# Patient Record
Sex: MALE | Race: WHITE | NOT HISPANIC OR LATINO | URBAN - METROPOLITAN AREA
[De-identification: names, ages, dates, MRNs, and addresses within clinical notes are randomized per-mention and may not be internally consistent; named-entity substitution may affect disease eponyms.]

---

## 2019-10-08 ENCOUNTER — OUTPATIENT (OUTPATIENT)
Dept: OUTPATIENT SERVICES | Facility: HOSPITAL | Age: 74
LOS: 1 days | End: 2019-10-08
Payer: MEDICARE

## 2019-10-08 VITALS
HEART RATE: 36 BPM | DIASTOLIC BLOOD PRESSURE: 77 MMHG | HEIGHT: 70 IN | WEIGHT: 210.98 LBS | RESPIRATION RATE: 18 BRPM | SYSTOLIC BLOOD PRESSURE: 148 MMHG | TEMPERATURE: 98 F | OXYGEN SATURATION: 100 %

## 2019-10-08 DIAGNOSIS — Z90.49 ACQUIRED ABSENCE OF OTHER SPECIFIED PARTS OF DIGESTIVE TRACT: Chronic | ICD-10-CM

## 2019-10-08 DIAGNOSIS — Z90.89 ACQUIRED ABSENCE OF OTHER ORGANS: Chronic | ICD-10-CM

## 2019-10-08 DIAGNOSIS — Z01.818 ENCOUNTER FOR OTHER PREPROCEDURAL EXAMINATION: ICD-10-CM

## 2019-10-08 DIAGNOSIS — Z98.890 OTHER SPECIFIED POSTPROCEDURAL STATES: Chronic | ICD-10-CM

## 2019-10-08 LAB
ALBUMIN SERPL ELPH-MCNC: 4.1 G/DL — SIGNIFICANT CHANGE UP (ref 3.3–5.2)
ALP SERPL-CCNC: 91 U/L — SIGNIFICANT CHANGE UP (ref 40–120)
ALT FLD-CCNC: 38 U/L — SIGNIFICANT CHANGE UP
ANION GAP SERPL CALC-SCNC: 12 MMOL/L — SIGNIFICANT CHANGE UP (ref 5–17)
APTT BLD: 37.6 SEC — HIGH (ref 27.5–36.3)
AST SERPL-CCNC: 32 U/L — SIGNIFICANT CHANGE UP
BILIRUB SERPL-MCNC: 0.5 MG/DL — SIGNIFICANT CHANGE UP (ref 0.4–2)
BUN SERPL-MCNC: 21 MG/DL — HIGH (ref 8–20)
CALCIUM SERPL-MCNC: 9.2 MG/DL — SIGNIFICANT CHANGE UP (ref 8.6–10.2)
CHLORIDE SERPL-SCNC: 105 MMOL/L — SIGNIFICANT CHANGE UP (ref 98–107)
CO2 SERPL-SCNC: 23 MMOL/L — SIGNIFICANT CHANGE UP (ref 22–29)
CREAT SERPL-MCNC: 1.01 MG/DL — SIGNIFICANT CHANGE UP (ref 0.5–1.3)
GLUCOSE SERPL-MCNC: 89 MG/DL — SIGNIFICANT CHANGE UP (ref 70–115)
HCT VFR BLD CALC: 43.1 % — SIGNIFICANT CHANGE UP (ref 39–50)
HGB BLD-MCNC: 13.5 G/DL — SIGNIFICANT CHANGE UP (ref 13–17)
INR BLD: 1.44 RATIO — HIGH (ref 0.88–1.16)
MCHC RBC-ENTMCNC: 31 PG — SIGNIFICANT CHANGE UP (ref 27–34)
MCHC RBC-ENTMCNC: 31.3 GM/DL — LOW (ref 32–36)
MCV RBC AUTO: 99.1 FL — SIGNIFICANT CHANGE UP (ref 80–100)
PLATELET # BLD AUTO: 180 K/UL — SIGNIFICANT CHANGE UP (ref 150–400)
POTASSIUM SERPL-MCNC: 4.7 MMOL/L — SIGNIFICANT CHANGE UP (ref 3.5–5.3)
POTASSIUM SERPL-SCNC: 4.7 MMOL/L — SIGNIFICANT CHANGE UP (ref 3.5–5.3)
PROT SERPL-MCNC: 7.4 G/DL — SIGNIFICANT CHANGE UP (ref 6.6–8.7)
PROTHROM AB SERPL-ACNC: 16.7 SEC — HIGH (ref 10–12.9)
RBC # BLD: 4.35 M/UL — SIGNIFICANT CHANGE UP (ref 4.2–5.8)
RBC # FLD: 13.4 % — SIGNIFICANT CHANGE UP (ref 10.3–14.5)
SODIUM SERPL-SCNC: 140 MMOL/L — SIGNIFICANT CHANGE UP (ref 135–145)
WBC # BLD: 7.32 K/UL — SIGNIFICANT CHANGE UP (ref 3.8–10.5)
WBC # FLD AUTO: 7.32 K/UL — SIGNIFICANT CHANGE UP (ref 3.8–10.5)

## 2019-10-08 PROCEDURE — 93010 ELECTROCARDIOGRAM REPORT: CPT

## 2019-10-08 PROCEDURE — 85027 COMPLETE CBC AUTOMATED: CPT

## 2019-10-08 PROCEDURE — G0463: CPT

## 2019-10-08 PROCEDURE — 80053 COMPREHEN METABOLIC PANEL: CPT

## 2019-10-08 PROCEDURE — 93005 ELECTROCARDIOGRAM TRACING: CPT

## 2019-10-08 PROCEDURE — 36415 COLL VENOUS BLD VENIPUNCTURE: CPT

## 2019-10-08 PROCEDURE — 85730 THROMBOPLASTIN TIME PARTIAL: CPT

## 2019-10-08 PROCEDURE — 85610 PROTHROMBIN TIME: CPT

## 2019-10-08 NOTE — ASU PATIENT PROFILE, ADULT - PMH
Gout    HLD (hyperlipidemia)    HTN (hypertension)    PAF (paroxysmal atrial fibrillation) Gout    HLD (hyperlipidemia)    HTN (hypertension)    PAF (paroxysmal atrial fibrillation)    Sleep apnea

## 2019-10-08 NOTE — H&P PST ADULT - ASSESSMENT
This is a 74 year old male with HTN HL recent new onset PAF that was found on routine examine  . Pt presented to cardiologist for evaluation. A nuclear stress was done that revleaed juanian that he had returned to Atrial fibrillation.   He was recommended to have further testing a was referred for Cardiac cath   He presents today for PST prior to [facility] on 10/15   PRE-PROCEDURE ASSESSMENT  [facility] due to new onset atrial fibrillation   -Patient seen and examined  -Labs reviewed  -Pre-procedure teaching completed with patient  -Questions answered  -Eliquis This is a 74 year old male with HTN HL recent new onset PAF that was found on routine examine  . Pt presented to cardiologist for evaluation. A nuclear stress was done that revroshand siobhan that he had returned to Atrial fibrillation.   He was recommended to have further testing a was referred for Cardiac cath   He presents today for PST prior to [facility] on 10/15   Currently normotensive with asymptomatic bradycardia   PRE-PROCEDURE ASSESSMENT  [facility] due to new onset atrial fibrillation   -Patient seen and examined  -Labs reviewed  -Pre-procedure teaching completed with patient  -Questions answered  - NPO  except for medications with a sip of water on day of procedure   -Eliquis last dose on 10/12   - No Eliquis 10/13 , 10/14, 10/15   Continue 25 mg metoprolol bid   Any change in condition seek medical attention   reviewed education at length with patient and wife at bedside

## 2019-10-08 NOTE — H&P PST ADULT - NSICDXPASTMEDICALHX_GEN_ALL_CORE_FT
PAST MEDICAL HISTORY:  Gout     HLD (hyperlipidemia)     HTN (hypertension)     PAF (paroxysmal atrial fibrillation)

## 2019-10-08 NOTE — H&P PST ADULT - HISTORY OF PRESENT ILLNESS
This is a 74 year old male with HTN HL recent new onset PAF that was found on routine examine  . Pt presented to cardiologist for evaluation. A nuclear stress was done that revleaed agian that he had returned to Atrial fibrillation.   He was recommended to have further testing a was referred for Cardiac cath   He presents today for PST prior to [facility] on 10/15   Mallampati   ASA   BRA   GFR  EKG 10/8/19   Last dose of Eliquis   NST 9/18/19:Stress response was HTN ST response was non-diagnostic , no chest pain , baseline sinus bradycardia . During exercise converted to Atrial fibrillation    Echo 9/16/19 : EF 65% LA mildly dilated /RA moderately enlarged RV mildly enlarged with normal systolic function. LV with with impaired relation  Valves: Normal Aortic keke  Moderate MR , Mild TR , This is a 74 year old male with HTN HL recent new onset PAF that was found on routine examine he was started on a Beta blocker and Eliquis  . Pt presented to cardiologist for evaluation. A nuclear stress was done that revealed again that he had returned to Atrial fibrillation. He was evaluated by Dr Watt on 10/7 and noted to be bradycardic and his beta blocker was decreased from 50 bid to 25 mg po Bid.   He was recommended to have further testing a was referred for Cardiac cath  Pt denies chest pain, SOB  dizziness , syncope, palpitations , leg swelling , black or blood in stool , fever chills night sweats ,   He presents today for PST prior to [facility] on 10/15   Mallampati 2  ASA 2  BRA   GFR  EKG 10/8/19 : SB rate 36  no acute ST or T wave changes no symptoms  Reviewed with Dr Watt   Last dose of Eliquis will be Saturday 10/12/19   NST 9/18/19:Stress response was HTN ST response was non-diagnostic , no chest pain , baseline sinus bradycardia . During exercise converted to Atrial fibrillation    Echo 9/16/19 : EF 65% LA mildly dilated /RA moderately enlarged RV mildly enlarged with normal systolic function. LV with with impaired relation  Valves: Normal Aortic keke  Moderate MR , Mild TR , This is a 74 year old male with HTN HL recent new onset PAF that was found on routine examine he was started on a Beta blocker and Eliquis  . Pt presented to cardiologist for evaluation. A nuclear stress was done that revealed again that he had returned to Atrial fibrillation. He was evaluated by Dr Watt on 10/7 and noted to be bradycardic and his beta blocker was decreased from 50 bid to 25 mg po Bid.   He was recommended to have further testing a was referred for Cardiac cath  Pt denies chest pain, SOB  dizziness , syncope, palpitations , leg swelling , black or blood in stool , fever chills night sweats ,   He presents today for PST prior to [facility] on 10/15   Mallampati 2  ASA 2  BRA 0.8%   GFR78   EKG 10/8/19 : SB rate 36  no acute ST or T wave changes no symptoms  Reviewed with Dr Watt 10/8/19   Last dose of Eliquis will be Saturday 10/12/19   NST 9/18/19:Stress response was HTN ST response was non-diagnostic , no chest pain , baseline sinus bradycardia . During exercise converted to Atrial fibrillation    Echo 9/16/19 : EF 65% LA mildly dilated /RA moderately enlarged RV mildly enlarged with normal systolic function. LV with with impaired relation  Valves: Normal Aortic keke  Moderate MR , Mild TR ,

## 2019-10-08 NOTE — ASU PATIENT PROFILE, ADULT - PSH
History of appendectomy H/O colonoscopy with polypectomy    History of appendectomy    History of tonsillectomy

## 2019-10-15 ENCOUNTER — TRANSCRIPTION ENCOUNTER (OUTPATIENT)
Age: 74
End: 2019-10-15

## 2019-10-15 ENCOUNTER — OUTPATIENT (OUTPATIENT)
Dept: OUTPATIENT SERVICES | Facility: HOSPITAL | Age: 74
LOS: 1 days | End: 2019-10-15
Payer: MEDICARE

## 2019-10-15 VITALS
HEART RATE: 77 BPM | RESPIRATION RATE: 16 BRPM | OXYGEN SATURATION: 97 % | TEMPERATURE: 98 F | SYSTOLIC BLOOD PRESSURE: 142 MMHG | DIASTOLIC BLOOD PRESSURE: 96 MMHG

## 2019-10-15 VITALS
HEART RATE: 75 BPM | DIASTOLIC BLOOD PRESSURE: 81 MMHG | SYSTOLIC BLOOD PRESSURE: 128 MMHG | OXYGEN SATURATION: 97 % | RESPIRATION RATE: 16 BRPM

## 2019-10-15 DIAGNOSIS — Z98.890 OTHER SPECIFIED POSTPROCEDURAL STATES: Chronic | ICD-10-CM

## 2019-10-15 DIAGNOSIS — R94.39 ABNORMAL RESULT OF OTHER CARDIOVASCULAR FUNCTION STUDY: ICD-10-CM

## 2019-10-15 DIAGNOSIS — Z90.89 ACQUIRED ABSENCE OF OTHER ORGANS: Chronic | ICD-10-CM

## 2019-10-15 DIAGNOSIS — Z90.49 ACQUIRED ABSENCE OF OTHER SPECIFIED PARTS OF DIGESTIVE TRACT: Chronic | ICD-10-CM

## 2019-10-15 PROCEDURE — C1887: CPT

## 2019-10-15 PROCEDURE — 93458 L HRT ARTERY/VENTRICLE ANGIO: CPT

## 2019-10-15 PROCEDURE — C1769: CPT

## 2019-10-15 PROCEDURE — C1894: CPT

## 2019-10-15 PROCEDURE — 99152 MOD SED SAME PHYS/QHP 5/>YRS: CPT

## 2019-10-15 PROCEDURE — C1760: CPT

## 2019-10-15 RX ORDER — LOSARTAN POTASSIUM 100 MG/1
1 TABLET, FILM COATED ORAL
Qty: 0 | Refills: 0 | DISCHARGE

## 2019-10-15 RX ORDER — SIMVASTATIN 20 MG/1
1 TABLET, FILM COATED ORAL
Qty: 0 | Refills: 0 | DISCHARGE

## 2019-10-15 RX ORDER — SODIUM CHLORIDE 9 MG/ML
1000 INJECTION INTRAMUSCULAR; INTRAVENOUS; SUBCUTANEOUS
Refills: 0 | Status: COMPLETED | OUTPATIENT
Start: 2019-10-15 | End: 2019-10-15

## 2019-10-15 RX ORDER — APIXABAN 2.5 MG/1
1 TABLET, FILM COATED ORAL
Qty: 0 | Refills: 0 | DISCHARGE

## 2019-10-15 RX ORDER — AMLODIPINE BESYLATE 2.5 MG/1
5 TABLET ORAL ONCE
Refills: 0 | Status: COMPLETED | OUTPATIENT
Start: 2019-10-15 | End: 2019-10-15

## 2019-10-15 RX ORDER — ASPIRIN/CALCIUM CARB/MAGNESIUM 324 MG
81 TABLET ORAL ONCE
Refills: 0 | Status: COMPLETED | OUTPATIENT
Start: 2019-10-15 | End: 2019-10-15

## 2019-10-15 RX ORDER — CETIRIZINE HYDROCHLORIDE 10 MG/1
1 TABLET ORAL
Qty: 0 | Refills: 0 | DISCHARGE

## 2019-10-15 RX ORDER — METOPROLOL TARTRATE 50 MG
0.5 TABLET ORAL
Qty: 0 | Refills: 0 | DISCHARGE

## 2019-10-15 RX ORDER — CHOLECALCIFEROL (VITAMIN D3) 125 MCG
1 CAPSULE ORAL
Qty: 0 | Refills: 0 | DISCHARGE

## 2019-10-15 RX ORDER — AMLODIPINE BESYLATE 2.5 MG/1
1 TABLET ORAL
Qty: 0 | Refills: 0 | DISCHARGE

## 2019-10-15 RX ORDER — ALLOPURINOL 300 MG
1 TABLET ORAL
Qty: 0 | Refills: 0 | DISCHARGE

## 2019-10-15 RX ADMIN — SODIUM CHLORIDE 30 MILLILITER(S): 9 INJECTION INTRAMUSCULAR; INTRAVENOUS; SUBCUTANEOUS at 18:53

## 2019-10-15 RX ADMIN — Medication 81 MILLIGRAM(S): at 13:08

## 2019-10-15 RX ADMIN — AMLODIPINE BESYLATE 5 MILLIGRAM(S): 2.5 TABLET ORAL at 18:14

## 2019-10-15 NOTE — DISCHARGE NOTE PROVIDER - NSDCCPCAREPLAN_GEN_ALL_CORE_FT
PRINCIPAL DISCHARGE DIAGNOSIS  Diagnosis: S/P cardiac catheterization  Assessment and Plan of Treatment: Groin precautions   Resrt Eliquis on 10/16 am

## 2019-10-15 NOTE — DISCHARGE NOTE PROVIDER - HOSPITAL COURSE
s/p LHC revealing non obstructive CAD        Patient feels well.  Denies chest pain, shortness of breath, dizziness or palpitations at this time        Right groin procedure site CDI.  no bleeding, no hematoma, site soft, non tender, positive pedal pulses bilaterally Hemostasis with Angio-Seal Device     T(C): 36.4 (10-15-19 @ 12:37), Max: 36.4 (10-15-19 @ 12:37)    HR: 72 (10-15-19 @ 16:25) (72 - 85)    BP: 136/84 (10-15-19 @ 16:25) (136/84 - 154/94)    RR: 16 (10-15-19 @ 16:25) (16 - 16)    SpO2: 98% (10-15-19 @ 16:25) (97% - 98%)            Cardiac Cath Lab - Adult (10.15.19 @ 15:18) >    EF 55 %.    VALVES: MITRAL VALVE: The mitral valve exhibited trivial regurgitation    (less than 1+).    CORONARY VESSELS: The coronary circulation is right dominant.    LM:   --  LM: Normal.    LAD:   --  Mid LAD: Angiography  minor luminal irregularities with noflow limiting lesions.    Distal LAD: There was a diffuse 20 % stenosis.    CX:   --  Circumflex: Normal.    RCA:   --  Proximal RCA: There was a tubular 25 % stenosis.                A/p s/p LHC  done via RFA tolerated well revealing non obstructive CAD        -continue tele till discharge         -vital signs, labs, diet and activity per post procedure orders    - ambulate ad tasha post bedrest    -encourage PO fluids     - Restart Eliquis 10/16 am     - Groin precautions     -plan of care discussed with patient, family and MD     -post procedure and d/c instructions reviewed    -follow up with MD in 1-2 weeks    -Discussed therapeutic lifestyle changes to reduce risk factors such as following a cardiac diet, weight loss, maintaining a healthy weight, exercise, smoking cessation, m

## 2019-10-15 NOTE — PROGRESS NOTE ADULT - SUBJECTIVE AND OBJECTIVE BOX
s/p LHC revealing non obstructive CAD    Patient feels well.  Denies chest pain, shortness of breath, dizziness or palpitations at this time    Right groin procedure site CDI.  no bleeding, no hematoma, site soft, non tender, positive pedal pulses bilaterally Hemostasis with Angio-Seal Device   T(C): 36.4 (10-15-19 @ 12:37), Max: 36.4 (10-15-19 @ 12:37)  HR: 72 (10-15-19 @ 16:25) (72 - 85)  BP: 136/84 (10-15-19 @ 16:25) (136/84 - 154/94)  RR: 16 (10-15-19 @ 16:25) (16 - 16)  SpO2: 98% (10-15-19 @ 16:25) (97% - 98%)      Cardiac Cath Lab - Adult (10.15.19 @ 15:18) >  EF estimatedwas 55 %.  VALVES: MITRAL VALVE: The mitral valve exhibited trivial regurgitation  (less than 1+).  CORONARY VESSELS: The coronary circulation is right dominant.  LM:   --  LM: Normal.  LAD:   --  Mid LAD: Angiography  minor luminal irregularities with noflow limiting lesions.  Distal LAD: There was a diffuse 20 % stenosis.  CX:   --  Circumflex: Normal.  RCA:   --  Proximal RCA: There was a tubular 25 % stenosis.        A/p s/p LHC  done via RFA tolerated well revealing non obstructive CAD    -continue tele till discharge     -vital signs, labs, diet and activity per post procedure orders  - ambulate ad tasha post bedrest  -encourage PO fluids   - Restart Eliquis 10/16 am   - Groin precautions   -plan of care discussed with patient, family and MD   -post procedure and d/c instructions reviewed  -follow up with MD in 1-2 weeks  -Discussed therapeutic lifestyle changes to reduce risk factors such as following a cardiac diet, weight loss, maintaining a healthy weight, exercise, smoking cessation, medication compliance, and regular follow-up  with MD to know your numbers (BP, cholesterol, weight, and glucose)

## 2019-10-15 NOTE — DISCHARGE NOTE PROVIDER - CARE PROVIDER_API CALL
Curt Watt)  Cardiovascular Disease; Interventional Cardiology  [address] [place] [address]  Phone: [phone]  Fax: [phone]  Follow Up Time:

## 2019-10-15 NOTE — DISCHARGE NOTE PROVIDER - NSDCADMDATE_GEN_ALL_CORE_FT
15-Oct-2019 10:54 [No Acute Distress] : no acute distress [Well Nourished] : well nourished [Well Developed] : well developed [Well-Appearing] : well-appearing [Normal Sclera/Conjunctiva] : normal sclera/conjunctiva [PERRL] : pupils equal round and reactive to light [EOMI] : extraocular movements intact [Normal Outer Ear/Nose] : the outer ears and nose were normal in appearance [Normal Oropharynx] : the oropharynx was normal [No JVD] : no jugular venous distention [Supple] : supple [No Lymphadenopathy] : no lymphadenopathy [Thyroid Normal, No Nodules] : the thyroid was normal and there were no nodules present [No Respiratory Distress] : no respiratory distress  [Clear to Auscultation] : lungs were clear to auscultation bilaterally [No Accessory Muscle Use] : no accessory muscle use [Normal Rate] : normal rate  [Regular Rhythm] : with a regular rhythm [Normal S1, S2] : normal S1 and S2 [No Murmur] : no murmur heard [No Carotid Bruits] : no carotid bruits [No Abdominal Bruit] : a ~M bruit was not heard ~T in the abdomen [No Varicosities] : no varicosities [Pedal Pulses Present] : the pedal pulses are present [No Extremity Clubbing/Cyanosis] : no extremity clubbing/cyanosis [No Palpable Aorta] : no palpable aorta [Soft] : abdomen soft [Non Tender] : non-tender [No Masses] : no abdominal mass palpated [No HSM] : no HSM [Normal Bowel Sounds] : normal bowel sounds [Normal Supraclavicular Nodes] : no supraclavicular lymphadenopathy [Normal Posterior Cervical Nodes] : no posterior cervical lymphadenopathy [Normal Anterior Cervical Nodes] : no anterior cervical lymphadenopathy [No CVA Tenderness] : no CVA  tenderness [No Spinal Tenderness] : no spinal tenderness [No Joint Swelling] : no joint swelling [Grossly Normal Strength/Tone] : grossly normal strength/tone [No Rash] : no rash [Coordination Grossly Intact] : coordination grossly intact [No Focal Deficits] : no focal deficits [Speech Grossly Normal] : speech grossly normal [Memory Grossly Normal] : memory grossly normal [Normal Affect] : the affect was normal [Alert and Oriented x3] : oriented to person, place, and time [Normal Mood] : the mood was normal [Normal Insight/Judgement] : insight and judgment were intact [de-identified] : Obese [de-identified] : Supplemental O2 in place via nasal cannula [de-identified] : Patient on portable oxygen;  [de-identified] : Trace b/l LE pitting edema [de-identified] : Abdomen distended  [de-identified] : Generalized rash over left upper extremity. Right foot erythema between 4-5th toes. warmth.

## 2021-08-21 ENCOUNTER — EMERGENCY (EMERGENCY)
Facility: HOSPITAL | Age: 76
LOS: 1 days | Discharge: HOME | End: 2021-08-21
Attending: EMERGENCY MEDICINE | Admitting: EMERGENCY MEDICINE
Payer: MEDICARE

## 2021-08-21 VITALS
RESPIRATION RATE: 16 BRPM | HEART RATE: 88 BPM | OXYGEN SATURATION: 98 % | SYSTOLIC BLOOD PRESSURE: 125 MMHG | DIASTOLIC BLOOD PRESSURE: 88 MMHG | TEMPERATURE: 98 F

## 2021-08-21 VITALS
TEMPERATURE: 98 F | WEIGHT: 199.96 LBS | HEART RATE: 65 BPM | SYSTOLIC BLOOD PRESSURE: 152 MMHG | HEIGHT: 70 IN | DIASTOLIC BLOOD PRESSURE: 98 MMHG | OXYGEN SATURATION: 98 % | RESPIRATION RATE: 16 BRPM

## 2021-08-21 DIAGNOSIS — Z98.890 OTHER SPECIFIED POSTPROCEDURAL STATES: Chronic | ICD-10-CM

## 2021-08-21 DIAGNOSIS — Z90.89 ACQUIRED ABSENCE OF OTHER ORGANS: Chronic | ICD-10-CM

## 2021-08-21 DIAGNOSIS — Z90.49 ACQUIRED ABSENCE OF OTHER SPECIFIED PARTS OF DIGESTIVE TRACT: Chronic | ICD-10-CM

## 2021-08-21 PROCEDURE — 99285 EMERGENCY DEPT VISIT HI MDM: CPT | Mod: 57

## 2021-08-21 PROCEDURE — 90715 TDAP VACCINE 7 YRS/> IM: CPT

## 2021-08-21 PROCEDURE — 73140 X-RAY EXAM OF FINGER(S): CPT

## 2021-08-21 PROCEDURE — 72125 CT NECK SPINE W/O DYE: CPT

## 2021-08-21 PROCEDURE — 73140 X-RAY EXAM OF FINGER(S): CPT | Mod: 26,RT

## 2021-08-21 PROCEDURE — 70450 CT HEAD/BRAIN W/O DYE: CPT

## 2021-08-21 PROCEDURE — 26770 TREAT FINGER DISLOCATION: CPT | Mod: 54

## 2021-08-21 PROCEDURE — 99285 EMERGENCY DEPT VISIT HI MDM: CPT | Mod: 25

## 2021-08-21 PROCEDURE — 26770 TREAT FINGER DISLOCATION: CPT | Mod: F9

## 2021-08-21 PROCEDURE — 70450 CT HEAD/BRAIN W/O DYE: CPT | Mod: 26

## 2021-08-21 PROCEDURE — 72125 CT NECK SPINE W/O DYE: CPT | Mod: 26

## 2021-08-21 PROCEDURE — 90471 IMMUNIZATION ADMIN: CPT

## 2021-08-21 RX ORDER — TETANUS TOXOID, REDUCED DIPHTHERIA TOXOID AND ACELLULAR PERTUSSIS VACCINE, ADSORBED 5; 2.5; 8; 8; 2.5 [IU]/.5ML; [IU]/.5ML; UG/.5ML; UG/.5ML; UG/.5ML
0.5 SUSPENSION INTRAMUSCULAR ONCE
Refills: 0 | Status: COMPLETED | OUTPATIENT
Start: 2021-08-21 | End: 2021-08-21

## 2021-08-21 RX ORDER — TETANUS TOXOID, REDUCED DIPHTHERIA TOXOID AND ACELLULAR PERTUSSIS VACCINE, ADSORBED 5; 2.5; 8; 8; 2.5 [IU]/.5ML; [IU]/.5ML; UG/.5ML; UG/.5ML; UG/.5ML
0.5 SUSPENSION INTRAMUSCULAR ONCE
Refills: 0 | Status: DISCONTINUED | OUTPATIENT
Start: 2021-08-21 | End: 2021-08-21

## 2021-08-21 RX ADMIN — TETANUS TOXOID, REDUCED DIPHTHERIA TOXOID AND ACELLULAR PERTUSSIS VACCINE, ADSORBED 0.5 MILLILITER(S): 5; 2.5; 8; 8; 2.5 SUSPENSION INTRAMUSCULAR at 19:52

## 2021-08-21 NOTE — ED PROVIDER NOTE - PATIENT PORTAL LINK FT
You can access the FollowMyHealth Patient Portal offered by [facility] by registering at the following website: [email or web address]. By joining [facility]’s FollowMyHealth portal, you will also be able to view your health information using other applications (apps) compatible with our system.

## 2021-08-21 NOTE — ED PROVIDER NOTE - OBJECTIVE STATEMENT
Pt is a 77 yo male hx paf, on eloquis, pt s/p witnessed trip and fall  by wife and fell forward on wood floors hitting right hand and forehead, pt denies loc, n/v, neck pain, but co right 5th digit pain and deformity, no numbness or weakness, no cp, sob, back pain,  pt ambulating with no limp

## 2021-08-21 NOTE — ED ADULT NURSE NOTE - NSICDXPASTMEDICALHX_GEN_ALL_CORE_FT
PAST MEDICAL HISTORY:  Gout     HLD (hyperlipidemia)     HTN (hypertension)     PAF (paroxysmal atrial fibrillation)     Sleep apnea

## 2021-08-21 NOTE — ED PROVIDER NOTE - CARE PROVIDER_API CALL
Dilshad Briceno (MD)  Plastic Surgery  [address], [address]  [place] [address]  Phone: [phone]  Fax: [phone]  Follow Up Time: 4-6 Days

## 2021-08-21 NOTE — ED PROVIDER NOTE - MUSCULOSKELETAL, MLM
right 5th digit with swelling and dislocation at pip joint, no other bony ttp, from all other joints

## 2021-08-21 NOTE — ED ADULT NURSE NOTE - NSICDXPASTSURGICALHX_GEN_ALL_CORE_FT
PAST SURGICAL HISTORY:  H/O colonoscopy with polypectomy     History of appendectomy     History of tonsillectomy

## 2021-08-21 NOTE — ED PROVIDER NOTE - CONSTITUTIONAL, MLM
normal... elderly, Well appearing, awake, alert, oriented to person, place, time/situation and in no apparent distress.

## 2021-08-21 NOTE — ED PROVIDER NOTE - DOMESTIC TRAVEL HIGH RISK QUESTION
Physical Exam  Mallampati: II  TM Distance: >3 FB  Neck ROM: Full  Cardio Rhythm: Regular  Cardio Rate: Normal  Breath sounds clear to auscultation:  Yes  Patient Demonstrates:  Obese  dental exam normal      Anesthesia Plan  ASA Status: 3  Anesthesia Type: MAC  Induction: Intravenous  Maintenance: TIVA  Reviewed: Lab Results, EKG, Chest X-Rays, Consultations, Nursing Notes, Pre-Induction Reassessment, Patient Summary, Beta Blocker Status, DNR Status, NPO Status, Allergies, Problem List, Medications and Past Med History  The proposed anesthetic plan, including its risks and benefits, have been discussed with the Patient - along with the risks and benefits of alternatives.  Questions were encouraged and answered and the patient and/or representative agrees to proceed.  Informed Consent for Blood: Refused  Patient is: Yazidi      Anesthesia ROS/Med Hx    Overall Review:  Pts. EKG was reviewed and Pts.echo was reviewed     Cardiovascular Review:  Cardiovascular ROS notes: Recent CABG and AVR 8/2017    Pt. positive for CAD  Pt. positive for CABG/stent  Pt. positive for valvular problems/murmurs (s/p AVR 8/2017) - murmur type AS  Pt. positive for hypertension  Pt. positive for hyperlipidemia  Pt. positive for dysrhythmias - Chronic A-fib    End/Other Review:    Pt. positive for anemia (likely due to suspected GI bleed) - Acute and Chronic    
No

## 2021-08-21 NOTE — ED PROCEDURE NOTE - CPROC ED TIME OUT STATEMENT1
“Patient's name, , procedure and correct site were confirmed during the [facility] Timeout.”
Attending Attestation (For Attendings USE Only)...

## 2021-08-21 NOTE — ED PROVIDER NOTE - CLINICAL SUMMARY MEDICAL DECISION MAKING FREE TEXT BOX
pt sp fall on eloquis, check ct head and neck, finger dislocation, reduced, splint, d/c and fu hand.

## 2021-08-21 NOTE — ED ADULT NURSE NOTE - OBJECTIVE STATEMENT
Patient alert and oriented X 3. Complaining of right 5th finger deformity, swelling s/p trip and fall today. Denies chest pain, shortness of breath, nausea, vomiting, headache, dizziness and fever. Lungs clears bilaterally. Respirations even and not labored. Abdomen soft non tender. Patient alert and oriented X 3. Complaining of right 5th finger deformity, swelling s/p trip and fall today. Patient admits to hitting head. No LOC on Eliquis. + radial pulse bilaterally. Denies chest pain, shortness of breath, nausea, vomiting, headache, dizziness and fever. Lungs clears bilaterally. Respirations even and not labored. Abdomen soft non tender.

## 2021-08-21 NOTE — ED ADULT TRIAGE NOTE - CHIEF COMPLAINT QUOTE
pt tripped and fell at home today, hit head on door jamb - on eliquis  pt has bump to forehead and nose, c/o pain to left knee, also has dislocated right 5th finger

## 2021-08-21 NOTE — ED PROVIDER NOTE - NSFOLLOWUPINSTRUCTIONS_ED_ALL_ED_FT
return for lethagy, vomiting, confusion.  numbness or weakness,  keep splint on finger and keep clean and dry,  tylenol  for pain,   ice, elevate.  follow up with hand surgeon in 3-5 days.

## 2021-08-21 NOTE — ED PROVIDER NOTE - CARE PLAN
Principal Discharge DX:	Fall  Secondary Diagnosis:	Finger dislocation  Secondary Diagnosis:	Finger dislocation   1

## 2021-08-22 ENCOUNTER — TRANSCRIPTION ENCOUNTER (OUTPATIENT)
Age: 76
End: 2021-08-22

## 2021-08-22 PROBLEM — M10.9 GOUT, UNSPECIFIED: Chronic | Status: ACTIVE | Noted: 2019-10-08

## 2021-08-22 PROBLEM — G47.30 SLEEP APNEA, UNSPECIFIED: Chronic | Status: ACTIVE | Noted: 2019-10-08

## 2021-08-22 PROBLEM — E78.5 HYPERLIPIDEMIA, UNSPECIFIED: Chronic | Status: ACTIVE | Noted: 2019-10-08

## 2021-08-22 PROBLEM — I10 ESSENTIAL (PRIMARY) HYPERTENSION: Chronic | Status: ACTIVE | Noted: 2019-10-08

## 2021-08-22 PROBLEM — I48.0 PAROXYSMAL ATRIAL FIBRILLATION: Chronic | Status: ACTIVE | Noted: 2019-10-08

## 2021-08-23 NOTE — DISCHARGE NOTE PROVIDER - NSDCQMSTROKE_NEU_ALL_CORE
Patient scheduled for renal us  at [address] [place] [address],[address] on Aug 30 at 8 arrive at 7:45. Patient advised of instructions.   Order Lina Raphael to patient No

## 2022-02-02 NOTE — H&P PST ADULT - GENERAL
Hi doctor pt wife called to refill prescriptions because he is almost out of medicine. The pharmacy is [facility] [phone].   Thank you   opal negative

## 2022-05-10 NOTE — ED PROVIDER NOTE - CPE EDP EYES NORM
normal... Hydroquinone Counseling:  Patient advised that medication may result in skin irritation, lightening (hypopigmentation), dryness, and burning.  In the event of skin irritation, the patient was advised to reduce the amount of the drug applied or use it less frequently.  Rarely, spots that are treated with hydroquinone can become darker (pseudoochronosis).  Should this occur, patient instructed to stop medication and call the office. The patient verbalized understanding of the proper use and possible adverse effects of hydroquinone.  All of the patient's questions and concerns were addressed.

## 2022-12-06 NOTE — DISCHARGE NOTE PROVIDER - NSDCFUADDINST_GEN_ALL_CORE_FT
[facility] Follow Up Evaluation    Referred by: Mable Andre PA-C  Reason for referral: back pain    CC:   Chief Complaint   Patient presents with    Back Pain      Last 3 PDI Scores 8/4/2022 2/2/2021 4/16/2014   Pain Disability Index (PDI) 50 24 40       Interval HPI 12/6/22: Mr. Pike presents to the office for follow up.  Since I last saw him he is status post L4-5 LETITIA on 9/14/22 without significant relief of his pain.  Today he reports that he continues to have back pain that radiates down the right greater than left leg.  He reports he gets intermittent numbness down into the right leg as well.  His pain is constant but it is exacerbated with standing and walking.  [place] describes [place] can not tolerate an outing there and needs to sit and rest.  Denies any weakness.    Pain intervention history:   - s/p ganglion impar block on 2/11/21 and reports near 100% relief   - s/p ganglion impar block on 8/19/21 without significant relief.  - s/p L4-5 LETITIA on 9/14/22 without significant relief    HPI:   Zachariah Pike is a 75 y.o. male who complains of back pain    Onset: 45 years  Inciting Event: fell on tailbone while moving a refrigerator   Progression: since onset, pain is stable  Current Pain Score: 10/10  Timing: constant  Quality: sharp, electric  Radiation: no  Associated numbness or weakness: no numbness, no weakness   Exacerbated by: sitting, bending  Allievated by: nothing  Is Pain Level Acceptable?: No    Previous Therapies:  PT/OT:   HEP:   Interventions:   Surgery:  Medications:   - NSAIDS: mobic   - MSK Relaxants: flexeril  - TCAs:   - SNRIs:   - Topicals:   - Anticonvulsants: gabapentin  - Opioids:     History:    Current Outpatient Medications:     ACCU-CHEK PRASHANT PLUS TEST STRP Strp, INJECT 1 EACH INTO THE SKIN 2 (TWO) TIMES DAILY., Disp: 100 strip, Rfl: 2    ACCU-CHEK SOFTCLIX LANCETS MISC, Accu-Chek Softclix Lancets, Disp: , Rfl:      "amiodarone (PACERONE) 200 MG Tab, Take 200 mg by mouth once daily., Disp: , Rfl:     aspirin 325 MG tablet, Take 325 mg by mouth once daily., Disp: , Rfl:     clopidogreL (PLAVIX) 75 mg tablet, Take 1 tablet (75 mg total) by mouth once daily., Disp: 90 tablet, Rfl: 2    FLUoxetine 20 MG capsule, Take 1 capsule (20 mg total) by mouth once daily., Disp: 90 capsule, Rfl: 2    gabapentin (NEURONTIN) 600 MG tablet, TAKE 1 TABLET (600 MG TOTAL) BY MOUTH 2 (TWO) TIMES DAILY. TAKE 1 TABLETS NIGHTLY AS NEEDED, Disp: 180 tablet, Rfl: 2    insulin detemir U-100 (LEVEMIR FLEXTOUCH) 100 unit/mL (3 mL) SubQ InPn pen, Inject 15 Units into the skin 2 (two) times daily., Disp: 9 mL, Rfl: 6    loratadine (CLARITIN) 10 mg tablet, Take 1 tablet (10 mg total) by mouth once daily., Disp: , Rfl:     montelukast (SINGULAIR) 10 mg tablet, TAKE 1 TABLET BY MOUTH EVERY DAY IN THE EVENING, Disp: 90 tablet, Rfl: 1    pen needle, diabetic (BD ULTRA-FINE ARLEEN PEN NEEDLE) 32 gauge x 5/32" Ndle, 1 Units by Misc.(Non-Drug; Combo Route) route 2 (two) times a day., Disp: 100 each, Rfl: 3    sacubitriL-valsartan (ENTRESTO) 24-26 mg per tablet, Take 1 tablet by mouth 2 (two) times daily., Disp: 60 tablet, Rfl: 0    HYDROcodone-acetaminophen (NORCO) 5-325 mg per tablet, Take 1 tablet by mouth every 12 (twelve) hours as needed for Pain., Disp: 40 tablet, Rfl: 0    Past Medical History:   Diagnosis Date    Allergy     Aortic stenosis     Arthritis     Asthma     Attention deficit disorder of adult     CAD (coronary artery disease)     SEVERE:  angiogram 08/02/2017  Dr. Jean. results sent for scanning    CHF (congestive heart failure)     chronic systolic and diastolic    Chronic back pain     CKD (chronic kidney disease), stage III     COPD (chronic obstructive pulmonary disease)     Defibrillator discharge     Diabetes mellitus, type 2     Diverticulitis     HEARING LOSS     Heart murmur     History of colonoscopy 10/10/2014    Hyperlipidemia     " Hypertension     Otitis media     PVD (peripheral vascular disease)     Skin tear of forearm without complication, right, sequela 06/03/2018    Statin intolerance     Vertebral artery stenosis     90% stenosis.     Vertigo        Past Surgical History:   Procedure Laterality Date    ADENOIDECTOMY      BOWEL RESECTION  2004    CARDIAC DEFIBRILLATOR PLACEMENT      CATARACT EXTRACTION Bilateral 2005    [facility]    cataract surgery      CHEST SURGERY      chestwall rebuild (after accident)    CIRCUMCISION, PRIMARY      COLECTOMY      COLONOSCOPY      CORONARY ARTERY BYPASS GRAFT      CORONARY STENT PLACEMENT      EPIDURAL STEROID INJECTION INTO LUMBAR SPINE N/A 9/14/2022    Procedure: Injection-steroid-epidural-lumbar L4/5;  Surgeon: Joel Phlilips MD;  Location: [facility] OR;  Service: Pain Management;  Laterality: N/A;    extracorporeal shockwave lithotripsy      Fused Vertebrae      cervical fusion    INJECTION OF ANESTHETIC AGENT AROUND GANGLION IMPAR N/A 02/11/2021    Procedure: BLOCK, GANGLION IMPAR;  Surgeon: Joel Phillips MD;  Location: [facility] OR;  Service: Pain Management;  Laterality: N/A;    INJECTION OF ANESTHETIC AGENT AROUND GANGLION IMPAR N/A 08/19/2021    Procedure: BLOCK, GANGLION IMPAR;  Surgeon: Joel Phillips MD;  Location: [facility] OR;  Service: Pain Management;  Laterality: N/A;    PERIPHERAL ARTERIAL STENT GRAFT  11/2016    right leg     removal of colon polyp      SMALL INTESTINE SURGERY      diverticulosis    tonsillectomy      TRANSCATHETER AORTIC VALVE REPLACEMENT (TAVR)  01/17/2019    Procedure: REPLACEMENT, AORTIC VALVE, TRANSCATHETER (TAVR);  Surgeon: Abdelrahman Antony MD;  Location: [facility] CATH LAB;  Service: Cardiology;;    TRANSCATHETER AORTIC VALVE REPLACEMENT (TAVR) BY TRANSAPICAL APPROACH N/A 01/17/2019    Procedure: REPLACEMENT, AORTIC VALVE, TRANSCATHETER, TRANSAPICAL APPROACH;  Surgeon: Kareem Craig MD;  Location: [facility] OR [facility] FLR;  Service: Cardiovascular;  Laterality: N/A;     TRANSCATHETER AORTIC VALVE REPLACEMENT (TAVR) BY TRANSAPICAL APPROACH N/A 2019    Procedure: REPLACEMENT, AORTIC VALVE, TRANSCATHETER, TRANSAPICAL APPROACH;  Surgeon: Abdelrahman Antony MD;  Location: [facility] CATH LAB;  Service: Cardiology;  Laterality: N/A;  OR11 CASE, ERECTOR SPINAL (REGIONAL) BLOCK , ALONG WITH GENERAL ANESTHESIA    VASECTOMY      VASECTOMY REVERSAL         Family History   Problem Relation Age of Onset    Macular degeneration Father     Psoriasis Daughter     Glaucoma Neg Hx     Retinal detachment Neg Hx     Allergic rhinitis Neg Hx     Allergies Neg Hx     Angioedema Neg Hx     Asthma Neg Hx     Atopy Neg Hx     Eczema Neg Hx     Immunodeficiency Neg Hx     Rhinitis Neg Hx     Urticaria Neg Hx        Social History     Socioeconomic History    Marital status:    Tobacco Use    Smoking status: Former     Packs/day: 1.00     Years: 50.00     Pack years: 50.00     Types: Cigarettes, Vaping with nicotine     Quit date: 3/1/2022     Years since quittin.7    Smokeless tobacco: Never    Tobacco comments:     no longer vapes as of 8/10/21    Substance and Sexual Activity    Alcohol use: Not Currently     Comment: rarely    Drug use: No    Sexual activity: Yes     Partners: Female       Review of patient's allergies indicates:   Allergen Reactions    Clindamycin Other (See Comments)     Throat swelling , nausea, diarrhea    Penicillins Diarrhea    Oxycodone-acetaminophen Hives     Pt states he can take tylenol, hydrocodone with no problem    Statins-hmg-coa reductase inhibitors Swelling       Review of Systems:  General ROS: negative for - fever  Psychological ROS: negative for - hostility  Hematological and Lymphatic ROS: negative for - bleeding problems  Endocrine ROS: negative for - unexpected weight changes  Respiratory ROS: no cough, shortness of breath, or wheezing  Cardiovascular ROS: no chest pain or dyspnea on exertion  Gastrointestinal ROS: no abdominal pain, change in bowel  "habits, or black or bloody stools  Musculoskeletal ROS: negative for - muscular weakness  Neurological ROS: negative for - numbness/tingling  Dermatological ROS: negative for rash    Physical Exam:  Vitals:    12/06/22 0948   BP: 129/68   Pulse: 71   SpO2: 99%   Weight: 81.7 kg (180 lb 1.9 oz)   Height: 5' 9" (1.753 m)   PainSc:   9   PainLoc: Back     Body mass index is 26.6 kg/m².     Gen: NAD  Gait: gait intact  Psych:  Mood appropriate for given condition  HEENT: eyes anicteric   GI: Abd soft  CV: RRR  Lungs: breathing unlabored   ROM: limited AROM of the L spine in all planes, full ROM at ankles, knees and hips  Sensation: intact to light touch in all dermatomes tested from L2-S1 bilaterally  Reflexes: 0 b/l patella and achilles  Palpation: + TTP over the coccyx  Tone: normal in the b/l knees and hips   Skin: intact  Extremities: No edema in b/l ankles or hands  Provacative tests:        Right Left   L2/3 Iliacus Hip flexion  5  5   L3/4 Qudratus Femoris Knee Extension  5  5   L4/5 Tib Anterior Ankle Dorsiflexion   5  5   L5/S1 Extensor Hallicus Longus Great toe extension  5  5                 S1/S2 Gastroc/Soleus Plantar Flexion  5  5       Imaging:  Xray sacrum 1/21/21  FINDINGS:  There is posterior subluxation of the lowest coccygeal segment, of approximately 50%.  No acute fracture.    Mild degenerative disc disease at the lowest 3 lumbar levels.  Facet degenerative change at the lowest lumbar levels worst at L5-S1.  Partially imaged degenerative change of the left hip joint.  Atherosclerosis and right iliac vascular stent.  Chain suture line projects over the midline pelvis.    MRI lumbar spine 6/23/22  FINDINGS:  NOMENCLATURE: Five lumbar type vertebral bodies.     CORD/CAUDA EQUINA: Conus has normal size and signal and ends at a normal level of L1-L2.  No abnormal intrathecal enhancement.     ALIGNMENT: 3 mm retrolisthesis of L3 on L4.     BONES: Vertebral body heights are maintained.  Multilevel tiny " Schmorl's nodes.  Multiple endplate changes with type 1 endplate changes at L2-3.  No aggressive bone marrow signal.     PARASPINAL AREA: Normal.     LUMBAR DISC LEVELS:     T12-L1: Minimal disc bulge.  Disc height loss.  Minimal narrowing of the bilateral lateral recesses.  No significant spinal canal or foraminal stenosis.  L1-L2: Mild disc bulge. Mild bilateral facet hypertrophy. Ligamentum flavum thickening. Minimal narrowing of the right greater than left lateral recesses.  No significant spinal canal stenosis.  Minimal left foraminal stenosis.  L2-L3: Retrolisthesis.  Uncovering the disc and moderate disc bulge with superimposed bilateral foraminal extrusions.  Mild bilateral facet hypertrophy.  Ligamentum flavum thickening.  Moderate-severe spinal canal stenosis with trace preserved CSF surrounding the nerve roots.  Mild-moderate bilateral foraminal stenosis.  L3-L4: Mild-moderate disc bulge.  Moderate left and mild-moderate right facet hypertrophy.  Minimal bilateral joint ligamentum flavum thickening.  Moderate narrowing of the bilateral lateral recesses.  Moderate-severe spinal canal stenosis with some preserved CSF surrounding the nerve roots.  Mild bilateral foraminal stenosis.  L4-L5: Mild disc bulge.  Mild-moderate left and mild right facet hypertrophy.  Mild-moderate narrowing of the bilateral lateral recesses.  No significant spinal canal stenosis.  Mild bilateral foraminal stenosis.  L5-S1: Tiny central protrusion.  Moderate left and mild right facet hypertrophy.  Trace left facet effusion.  Minimal narrowing of the bilateral lateral recesses.  No significant spinal canal or foraminal stenosis.    Labs:  BMP  Lab Results   Component Value Date     10/14/2022    K 4.9 10/14/2022     10/14/2022    CO2 25 10/14/2022    BUN 37 (H) 10/14/2022    CREATININE 1.7 (H) 10/14/2022    CALCIUM 8.9 10/14/2022    ANIONGAP 9 10/14/2022    ESTGFRAFRICA >60 06/27/2022    EGFRNONAA >60 06/27/2022     Lab  Results   Component Value Date    ALT 39 10/14/2022    AST 17 10/14/2022    ALKPHOS 83 10/14/2022    BILITOT 0.3 10/14/2022       Assessment:   Problem List Items Addressed This Visit          Neuro    Spinal stenosis    Relevant Medications    HYDROcodone-acetaminophen (NORCO) 5-325 mg per tablet     Other Visit Diagnoses       Lumbar radiculopathy    -  Primary    Relevant Orders    X-Ray Lumbar Spine Ap Lateral w/Flex Ext              75 y.o. year old male with PMH HTN, CAD, PVD presents to the office with coccyx pain.  He's had this pain for 45 years after he fell on his tailbone while carrying a refrigerator.  Today his pain is 10/10, constant, sharp, electric in the coccyx.  Worse with sitting and bending    12/6/22 - Mr. Pike presents to the office for follow up.  Since I last saw him he is status post L4-5 LETITIA on 9/14/22 without significant relief of his pain.  Today he reports that he continues to have back pain that radiates down the right greater than left leg.  He reports he gets intermittent numbness down into the right leg as well.  His pain is constant but it is exacerbated with standing and walking.  [place] describes [place] not tolerate an outing there and needs to sit and rest.  Denies any weakness.    - on exam today he has full strength intact sensation to light touch in lower extremities.    - I independently reviewed his lumbar MRI is consistent with severe central canal stenosis at L2-3 and L3-4  - he has completed formal PT in the past and he continues to take gabapentin 600mg BID for the neuropathic component of his pain  - he has chronic coccyx pain that he has been dealing with for over 50 years and this is known and I think a separate issue from his lower back pain  - he has seen Neurosurgery in the past but at that time was not having any symptoms of claudication.  At this time I feel like he is developing symptoms of claudication  - we are  going to try to maximize conservative treatment before sending him back to Neurosurgery for a surgical discussion.  Since he failed to get relief with an L4-5 LETITIA targeting his severe narrowing at L2-3 and L3-4 I am going to do a bilateral L2-3 TFESI to see if we can get some spread of medication at the higher level of his stenosis.  The risks and benefits of this intervention, and alternative therapies were discussed with the patient.  The discussion of risks included infection, bleeding, need for additional procedures or surgery, nerve damage.  Questions regarding the procedure, risks, expected outcome, and possible side effects were solicited and answered to the patient's satisfaction.  Neal Givens wishes to proceed with the injection or procedure.  Written consent was obtained.  - we will need to get clearance for him to hold his aspirin and Plavix.    - Follow-up 2 weeks post injection.  If he fails to get significant relief then we will refer him back to Neurosurgery for an evaluation  - he has failed to get relief with tramadol so he will stop taking this and I prescribed him some hydrocodone to use 1 to 2 times a day as needed for severe pain    : Not applicable    Joel Phillips M.D.  Interventional Pain Medicine / Anesthesiology   Choose lean meats and poultry without skin and prepare them without added saturated and trans fat.  Eat fish at least twice a week. Recent research shows that eating oily fish containing omega-3 fatty acids (for example, salmon, trout and herring) may help lower your risk of death from coronary artery disease.  Select fat-free, 1 percent fat and low-fat dairy products.  Cut back on foods containing partially hydrogenated vegetable oils to reduce trans fat in your diet.   To lower cholesterol, reduce saturated fat to no more than 5 to 6 percent of total calories. For someone eating 2,000 calories a day, that’s about 13 grams of saturated fat.  Cut back on beverages and foods with added sugars.  Choose and prepare foods with little or no salt. To lower blood pressure, aim to eat no more than 2,400 milligrams of sodium per day. Reducing daily intake to 1,500 mg is desirable because it can lower blood pressure even further.  If you drink alcohol, drink in moderation. That means one drink per day if you’re a woman and two drinks  per day if you’re a man.  Follow the American Heart Association recommendations when you eat out, and keep an eye on your portion sizes.

## 2024-11-14 NOTE — DISCHARGE NOTE NURSING/CASE MANAGEMENT/SOCIAL WORK - PATIENT PORTAL LINK FT
You can access the FollowMyHealth Patient Portal offered by [facility] by registering at the following website: [email or web address]. By joining [facility]’s FollowMyHealth portal, you will also be able to view your health information using other applications (apps) compatible with our system.
I acted within this role throughout the entirety of the procedure performed by the primary surgeon